# Patient Record
Sex: FEMALE | Race: BLACK OR AFRICAN AMERICAN | NOT HISPANIC OR LATINO | URBAN - METROPOLITAN AREA
[De-identification: names, ages, dates, MRNs, and addresses within clinical notes are randomized per-mention and may not be internally consistent; named-entity substitution may affect disease eponyms.]

---
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
## 2024-11-25 NOTE — ED ADULT TRIAGE NOTE - RESPIRATORY RATE (BREATHS/MIN)
I spoke to Manda who wanted to discuss her ongoing neuropathic pain issues.   She is getting a CXR Friday at her local clinic and has a f/up with me next week.  I am refilling her oxycodone and gabapentin.   Questions answered.   19 Private car

## 2025-01-14 NOTE — ED ADULT TRIAGE NOTE - BMI (KG/M2)
Dangers of cigarette smoking were reviewed with patient in detail. Patient was Counseled for 10 minutes or greater. Nicotine replacement options were discussed. Nicotine replacement was discussed-  will need to discuss if she would like nicotine patch.    22.8